# Patient Record
Sex: FEMALE
[De-identification: names, ages, dates, MRNs, and addresses within clinical notes are randomized per-mention and may not be internally consistent; named-entity substitution may affect disease eponyms.]

---

## 2019-11-10 ENCOUNTER — HOSPITAL ENCOUNTER (EMERGENCY)
Dept: HOSPITAL 5 - ED | Age: 39
Discharge: HOME | End: 2019-11-10
Payer: MEDICARE

## 2019-11-10 VITALS — DIASTOLIC BLOOD PRESSURE: 70 MMHG | SYSTOLIC BLOOD PRESSURE: 148 MMHG

## 2019-11-10 DIAGNOSIS — Z79.899: ICD-10-CM

## 2019-11-10 DIAGNOSIS — J45.909: ICD-10-CM

## 2019-11-10 DIAGNOSIS — M62.838: ICD-10-CM

## 2019-11-10 DIAGNOSIS — F31.9: ICD-10-CM

## 2019-11-10 DIAGNOSIS — M54.12: Primary | ICD-10-CM

## 2019-11-10 DIAGNOSIS — Z91.040: ICD-10-CM

## 2019-11-10 DIAGNOSIS — Z88.8: ICD-10-CM

## 2019-11-10 DIAGNOSIS — Z90.49: ICD-10-CM

## 2019-11-10 DIAGNOSIS — I10: ICD-10-CM

## 2019-11-10 DIAGNOSIS — S46.811A: ICD-10-CM

## 2019-11-10 DIAGNOSIS — Z98.890: ICD-10-CM

## 2019-11-10 DIAGNOSIS — E11.8: ICD-10-CM

## 2019-11-10 PROCEDURE — 99282 EMERGENCY DEPT VISIT SF MDM: CPT

## 2019-11-10 PROCEDURE — 96372 THER/PROPH/DIAG INJ SC/IM: CPT

## 2019-11-10 NOTE — EMERGENCY DEPARTMENT REPORT
ED General Adult HPI





- General


Chief complaint: Extremity Injury, Upper


Stated complaint: LEFT SHOULDER, NECK, LEFT ARM, BACK PAIN


Source: patient


Mode of arrival: Ambulatory


Limitations: No Limitations





- History of Present Illness


Initial comments: 





Patient is a 39-year-old white female with a history of hypertension and 

non-insulin-dependent diabetes who presents to the ED with complaint of acute 

onset persistent severe left lateral neck pain that radiates to the left 

shoulder and on posterior upper thoracic area for the last 1 week.  Patient 

states that the pain is constant, sharp and worse with any palpation or 

movement.  Patient stated that she woke up with pain about one week ago and has 

been taking over-the-counter medications with no relief.  Patient states that 

the pain got worse the last 2 days especially in the last 8 hours side that she 

was unable to sleep.  Patient denies dizziness, chest pain, shortness of breath,

fall, traumatic injury, heavy lifting, headache, fever, chills, abdominal pain, 

syncope or palpitations.


MD Complaint: left shoulder, neck pain


-: Sudden, week(s) (1)


Location: neck (left lateral ), upper extremity (left shoulder and arm pain)


Radiation: extremity (left arm)


Severity scale (0 -10): 8


Quality: aching, sharp, constant


Consistency: constant


Improves with: none


Worsens with: movement


Associated Symptoms: denies other symptoms.  denies: confusion, chest pain, 

cough, diaphoresis, fever/chills, headaches, loss of appetite, malaise, 

nausea/vomiting, rash, seizure, shortness of breath, syncope, weakness


Treatments Prior to Arrival: none





- Related Data


                                  Previous Rx's











 Medication  Instructions  Recorded  Last Taken  Type


 


methOCARBAMOL [Robaxin] 500 mg PO Q6H PRN #20 tablet 14 Unknown Rx


 


HYDROcodone/APAP 5-325 [[place] 1 each PO Q6HR PRN #14 tablet 11/28/15 Unknown Rx





5-325 mg TAB]    


 


Sulfamethoxazole/Trimethoprim 1 each PO BID #14 tablet 11/28/15 Unknown Rx





[Bactrim DS TAB]    


 


Gabapentin 300 mg PO Q8HR PRN #30 capsule 11/10/19 Unknown Rx


 


Naproxen 500 mg PO Q12H PRN #30 tablet 11/10/19 Unknown Rx


 


methylPREDNISolone [Medrol 4MG 4 mg PO DAILY #21 tab.ds.pk 11/10/19 Unknown Rx





DOSEPAK (21 tabs)]    


 


tiZANidine [Zanaflex 4mg TAB] 4 mg PO Q8H PRN #24 tablet 11/10/19 Unknown Rx


 


traMADol [Ultram] 50 mg PO Q6HR PRN #12 tablet 11/10/19 Unknown Rx











                                    Allergies











Allergy/AdvReac Type Severity Reaction Status Date / Time


 


latex AdvReac  Rash Verified 14 17:10


 


metformin AdvReac  Rash Verified 14 17:10














ED Review of Systems


ROS: 


Stated complaint: LEFT SHOULDER, NECK, LEFT ARM, BACK PAIN


Other details as noted in HPI





Constitutional: denies: chills, fever


Eyes: denies: eye pain, eye discharge, vision change


ENT: denies: ear pain, throat pain


Respiratory: denies: cough, shortness of breath, wheezing


Cardiovascular: denies: chest pain, palpitations


Endocrine: no symptoms reported


Gastrointestinal: denies: abdominal pain, nausea, diarrhea


Genitourinary: denies: urgency, dysuria, discharge


Musculoskeletal: arthralgia (left shoulder; left lateral neck pain), myalgia.  

denies: back pain, joint swelling


Skin: denies: rash, lesions


Neurological: denies: headache, weakness, paresthesias


Psychiatric: denies: anxiety, depression


Hematological/Lymphatic: denies: easy bleeding, easy bruising





ED Past Medical Hx





- Past Medical History


Previous Medical History?: Yes


Hx Hypertension: Yes


Hx Diabetes: Yes


Hx Psychiatric Treatment: Yes (Bipolar)


Hx Asthma: Yes


Additional medical history: Bipolar.  Fatty Liver.  Fibromyalgia.  Degenerative 

disc disease.  buldging disc in back





- Surgical History


Past Surgical History?: Yes


Hx Cholecystectomy: Yes


Additional Surgical History: 





- Social History


Smoking Status: Never Smoker


Substance Use Type: Alcohol





- Medications


Home Medications: 


                                Home Medications











 Medication  Instructions  Recorded  Confirmed  Last Taken  Type


 


methOCARBAMOL [Robaxin] 500 mg PO Q6H PRN #20 tablet 14  Unknown Rx


 


HYDROcodone/APAP 5-325 [[place] 1 each PO Q6HR PRN #14 tablet 11/28/15  Unknown Rx





5-325 mg TAB]     


 


Sulfamethoxazole/Trimethoprim 1 each PO BID #14 tablet 11/28/15  Unknown Rx





[Bactrim DS TAB]     


 


Gabapentin 300 mg PO Q8HR PRN #30 capsule 11/10/19  Unknown Rx


 


Naproxen 500 mg PO Q12H PRN #30 tablet 11/10/19  Unknown Rx


 


methylPREDNISolone [Medrol 4MG 4 mg PO DAILY #21 tab.ds.pk 11/10/19  Unknown Rx





DOSEPAK (21 tabs)]     


 


tiZANidine [Zanaflex 4mg TAB] 4 mg PO Q8H PRN #24 tablet 11/10/19  Unknown Rx


 


traMADol [Ultram] 50 mg PO Q6HR PRN #12 tablet 11/10/19  Unknown Rx














ED Physical Exam





- General


Limitations: No Limitations


General appearance: alert, in no apparent distress





- Head


Head exam: Present: atraumatic, normocephalic, normal inspection





- Eye


Eye exam: Present: normal appearance, PERRL, EOMI


Pupils: Present: normal accommodation





- ENT


ENT exam: Present: normal exam, normal orophraynx, mucous membranes moist, TM's 

normal bilaterally, normal external ear exam





- Neck


Neck exam: Present: normal inspection, tenderness (palpable left lateral 

sternocleidomastoid and trapezius muscle tenderness), full ROM





- Respiratory


Respiratory exam: Present: normal lung sounds bilaterally.  Absent: respiratory 

distress, wheezes, rhonchi, chest wall tenderness, accessory muscle use, 

decreased breath sounds





- Cardiovascular


Cardiovascular Exam: Present: regular rate, normal rhythm, normal heart sounds. 

Absent: systolic murmur, diastolic murmur, rubs, gallop





- GI/Abdominal


GI/Abdominal exam: Present: soft, normal bowel sounds.  Absent: tenderness, 

guarding, rebound, hyperactive bowel sounds, hypoactive bowel sounds





- Extremities Exam


Extremities exam: Present: normal inspection, full ROM, tenderness (palpable 

left shoulder and left lateral sternocleidomastoid and trapezius muscle 

tenderness), normal capillary refill





- Back Exam


Back exam: Present: normal inspection, full ROM.  Absent: CVA tenderness (L), 

muscle spasm, paraspinal tenderness





- Neurological Exam


Neurological exam: Present: alert, oriented X3, CN II-XII intact, normal gait, 

reflexes normal





- Psychiatric


Psychiatric exam: Present: normal affect, normal mood





- Skin


Skin exam: Present: warm, dry, intact, normal color.  Absent: rash





ED Course





                                   Vital Signs











  11/10/19





  03:39


 


Temperature 97.8 F


 


Pulse Rate 85


 


Respiratory 20





Rate 


 


Blood Pressure 154/83


 


O2 Sat by Pulse 98





Oximetry 














ED Medical Decision Making





- Medical Decision Making





This is a 39-year-old female who presented to the ED with left lateral neck pain

with left shoulder pain for the last 1 week.  In the ED, patient is alert and 

oriented 3 in destruction and distress but appears to be in pain.  Patient was 

treated for pain in the ED and discharged home on medications and muscle 

relaxants.  Patient symptoms are likely due to left cervical radiculopathy and 

left lateral sternocleidomastoid and trapezius muscle strain.  Patient was 

otherwise follow-up with her primary care physician in 7-10 days for 

reevaluation or return to the ED immediately if symptoms get worse.





- Differential Diagnosis


cervical radiuclopathy; Muscle strain; cervical sprain; tendonitis


Critical care attestation.: 


If time is entered above; I have spent that time in minutes in the direct care 

of this critically ill patient, excluding procedure time.








ED Disposition


Clinical Impression: 


 Cervical radiculopathy, Cervical paraspinous muscle spasm, Strain of cervical 

portion of left trapezius muscle





Disposition: DC- TO HOME OR SELFCARE


Is pt being admited?: No


Does the pt Need Aspirin: No


Condition: Stable


Instructions:  Muscle Strain (ED), Cervical Sprain (ED), Cervical Radiculopathy 

(ED)


Additional Instructions: 


Take medication with food, drink plenty of fluids and follow-up with your 

primary care physician in 7-10 days for reevaluation.  Return to the ED 

immediately if symptoms get worse.


Prescriptions: 


Gabapentin 300 mg PO Q8HR PRN #30 capsule


 PRN Reason: Pain , Severe (7-10)


methylPREDNISolone [Medrol 4MG DOSEPAK (21 tabs)] 4 mg PO DAILY #21 tab.ds.pk


Naproxen 500 mg PO Q12H PRN #30 tablet


 PRN Reason: Pain , Severe (7-10)


traMADol [Ultram] 50 mg PO Q6HR PRN #12 tablet


 PRN Reason: Pain


tiZANidine [Zanaflex 4mg TAB] 4 mg PO Q8H PRN #24 tablet


 PRN Reason: Muscle Spasm


Referrals: 


AUSTIN MADRIGAL MD [Primary Care Provider] - 3-5 Days


Time of Disposition: 06:28


Print Language: ENGLISH